# Patient Record
Sex: MALE | Race: WHITE | NOT HISPANIC OR LATINO
[De-identification: names, ages, dates, MRNs, and addresses within clinical notes are randomized per-mention and may not be internally consistent; named-entity substitution may affect disease eponyms.]

---

## 2018-01-01 NOTE — PDOC
History of Present Illness





- General


Chief Complaint: Pain, Acute


Stated Complaint: STOMACH PAIN


Time Seen by Provider: 12/31/17 23:53


History Source: Patient


Exam Limitations: No Limitations





- History of Present Illness


Travel History: No


Initial Comments: 





01/01/18 00:18


57y M hx of ETOH abuse prsents with complaint of abdominal pain. Pt states the 

pain started pretty suddently around 7pm, associated with nbnb vomiting, is 

worse int he upper abd/right side. pt last ate dinner around 3-4 hrs prior to 

onset of the pain. Pt denies any cp, sob, fever/chills, diarrhea, melena, 

numbness/tingling/ewakness.  no prior history of similar pain before. the pain 

does not radiate. he has not taken any medications for the pain.  Pt denies any 

urinary complaint





psx: appendectomy


+frequent ETOH use, +binges on and off, drank alot this past week, last use was 

yesterday





Past History





- Past Medical History


Allergies/Adverse Reactions: 


 Allergies











Allergy/AdvReac Type Severity Reaction Status Date / Time


 


pollen extracts Allergy   Verified 01/01/18 00:33


 


seasonal allergies Allergy Mild  Uncoded 12/31/17 23:55











Home Medications: 


Ambulatory Orders





No Home Medications 0 dose .ROUTE UTDICT 09/30/12 











- Suicide/Smoking/Psychosocial Hx


Smoking Status: No


Smoking History: Never smoked


Have you smoked in the past 12 months: No


Number of Cigarettes Smoked Daily: 0


Information on smoking cessation initiated: No


Hx Alcohol Use: No


Drug/Substance Use Hx: No


Substance Use Type: None





**Review of Systems





- Review of Systems


Able to Perform ROS?: Yes


Comments:: 





01/01/18 00:23


Constitutional - no reported Fever, Chills, 


HEENT: no reported vision changes, sore throat


Respiratory: no reported cough, sob, hemoptysis


Cardiac: no reported chest pain, palpitations, light headedness, leg swelling


Abd/GI: + abd pain, nausea, vomiting, no reported blood per rectum, melena, 

diarrhea


: no reported dysuria, frequency, discharge


Musculskelatal - no reported back pain, joint swelling


skin - no reported bruising, erythema, rash


neurological: no reported headache, numbness, focal weakness, tingling, ataxia, 


hematologic: no reported anemia, easy bruising, easy bleeding








*Physical Exam





- Vital Signs


 Last Vital Signs











Temp Pulse Resp BP Pulse Ox


 


 97.9 F   65   22   165/77   100 


 


 12/31/17 23:55  12/31/17 23:55  12/31/17 23:55  12/31/17 23:55  12/31/17 23:55














- Physical Exam


Comments: 





01/01/18 00:24


GENERAL: The patient is awake, alert, and fully oriented, maoning in pain 


HEAD: Normocephalic, atraumatic.


EYES: extraocular movements intact, sclera anicteric, conjunctiva clear.


ENT: Normal voice,  Moist mucous membranes.


NECK: Normal range of motion, supple


LUNGS: Breath sounds equal, clear to auscultation bilaterally.  No wheezes, no 

rhonchi, no rales.


HEART: Regular rate and rhythm, normal S1 and S2 without murmur, rub or gallop.


ABDOMEN: +RUQ, epigastric tenderness on exam Soft, normoactive bowel sounds.  

No guarding, no rebound.  . No CVA tenderness


EXTREMITIES: Normal range of motion, no edema.  No clubbing or cyanosis. No 

cords, erythema, or tenderness.


NEUROLOGICAL: No facial assymetry, Normal speech, 


PSYCH: Normal mood, normal affect.


SKIN: Warm, Dry, normal turgor,





**Heart Score/ECG Review





- ECG Impressions


Comment:: 





01/01/18 00:54


Twelve-lead EKG was performed and reviewed by me. 


There is normal sinus rhythm with a normal rate. 


Rate of 83


The axis is normal. 


Artifacts present


No ST changes suggestive of acute ischemia





ED Treatment Course





- LABORATORY


CBC & Chemistry Diagram: 


 01/01/18 00:16





 01/01/18 00:16





Medical Decision Making





- Medical Decision Making





01/01/18 00:26


57y M presents with sudden onset of epgiastric/abdominal pain associated with 

vomiting that is nbnb


on exam pt is moaning in pain with RUQ tenderness





suspect pancreatitis vs gall stones


will ck us


will ck labs


will give morhine, fluids, zofran


will reassess








01/01/18 01:14


pt in persistent pain 


pt written for dilaudid 1mg


awaiting US





01/01/18 02:55


labs reviewed - essentially unremarkble beside mild hypokalemia


lipase and lfts wnl


pt feeling improved


currently asymptomatic


abdominal exam soft nontender


US shows some sludging without signs of acute cholecystitis





pt has a GI doc in [place] that he will follow up








I discussed the physical exam findings, ancillary test results and final 

diagnoses with the patient. I answered all of the patient's questions. The 

patient was satisfied with the care received and felt comfortable with the 

discharge plan and treatment plan. The patient will call their primary care 

physician within 24 hours to arrange follow-up and will return to the Emergency 

Department with any new, persistent or worsening symptoms. 








*DC/Admit/Observation/Transfer


Diagnosis at time of Disposition: 


Abdominal pain


Qualifiers:


 Abdominal location: right lower quadrant Qualified Code(s): R10.31 - Right 

lower quadrant pain








- Discharge Dispostion


Disposition: HOME


Condition at time of disposition: Improved


Admit: No





- Referrals


Referrals: 


Dyllan Gordon [Non Staff, Medical] - 





- Patient Instructions


Printed Discharge Instructions:  DI for Abdominal Pain-Adult


Additional Instructions: 


Return to the emergency department immediately with ANY new, persistent or 

worsening symptoms including worsening abdominal pain, fevers, inability to 

tolerate oral intake, chest pain, shortness of breath or any other concerns. 





Try to avoid any fatty foods. 


Stay well hydrated. 


A copy of your lab work and ultrasound results were included.


You MUST call and follow up with your doctor tomorrow. Make appointment with 

your GI doctor in [place]. Your emergency department visit is not complete 

without a followup with your doctor for reevaluation. Please make sure your 

doctor reviews the results of your emergency evaluation.





Print Language: ENGLISH





- Post Discharge Activity

## 2018-01-01 NOTE — EKG
Test Reason : 

Blood Pressure : ***/*** mmHG

Vent. Rate : 065 BPM     Atrial Rate : 065 BPM

   P-R Int : 172 ms          QRS Dur : 066 ms

    QT Int : 432 ms       P-R-T Axes : 053 048 023 degrees

   QTc Int : 449 ms

 

SINUS RHYTHM WITH MARKED SINUS ARRHYTHMIA

NONSPECIFIC ST ABNORMALITY

ABNORMAL ECG

WHEN COMPARED WITH ECG OF 30-SEP-2012 21:48,

NO SIGNIFICANT CHANGE WAS FOUND

BASELINE ARTIFACT AND BASELINE WANDER

Confirmed by JOSE SZYMANSKI, DRABY ([id number]) on 1/1/2018 2:13:46 PM

 

Referred By:             Confirmed By:DARBY GARCIA MD

## 2018-01-02 NOTE — HP
CHIEF COMPLAINT: Abdominal pain, nausea, vomiting





PCP:





HISTORY OF PRESENT ILLNESS:


57 year-old male with a PMH significant for perforated appendicitis s/p lap 

appendectomy in August 2017, and alcohol abuse s/p 30-day detox in September 2017 in [place]. Patient drinks allyson most days, 12 shots per day. Lately has 

been trying to wean himself off alcohol, so yesterday only had 6 shots. On New 

Year's Zamzam, patient developed acute upper right abdominal pain with nausea and 

vomiting. He came to the ED where he was treated and released. He re-presented 

earlier today with the same symptoms. Patient denies fever, sweats, chills. He 

complains of constipation. 





ER course was notable for:


(1) Lactic acid 5.0-->0.9


(2) Total bili 0.4-->1.2 


(3) AST 12-->181


(4) CTAP: distended gallbladder with wall thickening and pericholecystic fat 

stranding, consistent with acute cholecystitis; no biliary duct dilitation 





Recent Travel:


[place]





PAST MEDICAL HISTORY:


Alcohol abuse





PAST SURGICAL HISTORY:


Appendectomy 08/2017





Social History:


Smoking: qit 11 years ago


Alcohol: allyson 12 shots per day


Drugs: denies





Family History:


Allergies


pollen extracts Allergy (Verified 01/02/18 08:16)





 ADDED FOR "SEASONAL ALLERGIES" 


seasonal allergies Allergy (Mild, Uncoded 01/02/18 08:16)


 








HOME MEDICATIONS:


 Home Medications











 Medication  Instructions  Recorded


 


Oxycodone HCl/Acetaminophen 1 tab PO Q6H PRN 01/02/18





[Percocet 5-325 mg Tablet]  








REVIEW OF SYSTEMS


CONSTITUTIONAL: 


Absent:  fever, chills, diaphoresis, generalized weakness, malaise, loss of 

appetite, weight change


HEENT: 


Absent:  rhinorrhea, nasal congestion, throat pain, throat swelling, difficulty 

swallowing, mouth swelling, ear pain, eye pain, visual changes


CARDIOVASCULAR: 


Absent: chest pain, syncope, palpitations, irregular heart rate, lightheadedness

, peripheral edema


RESPIRATORY: 


Absent: cough, shortness of breath, dyspnea with exertion, orthopnea, wheezing, 

stridor, hemoptysis


GASTROINTESTINAL:


+abdominal pain, distension, nausea, vomiting, constipation


Absent: diarrhea, melena, hematochezia


GENITOURINARY: 


Absent: dysuria, frequency, urgency, hesitancy, hematuria, flank pain, genital 

pain


MUSCULOSKELETAL: 


Absent: myalgia, arthralgia, joint swelling, back pain, neck pain


SKIN: 


Absent: rash, itching, pallor


HEMATOLOGIC/IMMUNOLOGIC: 


Absent: easy bleeding, easy bruising, lymphadenopathy, frequent infections


ENDOCRINE:


Absent: unexplained weight gain, unexplained weight loss, heat intolerance, 

cold intolerance


NEUROLOGIC: 


Absent: headache, focal weakness or paresthesias, dizziness, unsteady gait, 

seizure, mental status changes, bladder or bowel incontinence


PSYCHIATRIC: 


Absent: anxiety, depression, suicidal or homicidal ideation, hallucinations.








PHYSICAL EXAMINATION


 Vital Signs - 24 hr











  01/02/18





  08:19


 


Temperature 98.7 F


 


Pulse Rate 77


 


Respiratory 18





Rate 


 


Blood Pressure 139/91


 


O2 Sat by Pulse 100





Oximetry (%) 











GENERAL: Awake, alert, and fully oriented, in no acute distress.


HEAD: Normal with no signs of trauma.


EYES: Pupils equal, round and reactive to light, extraocular movements intact, 

sclera anicteric, conjunctiva clear. No lid lag.


EARS, NOSE, THROAT: Ears normal, nares patent, oropharynx clear without 

exudates. Moist mucous membranes.


NECK: Normal range of motion, supple without lymphadenopathy, JVD, or masses.


LUNGS: Breath sounds equal, clear to auscultation bilaterally. No wheezes, and 

no crackles. No accessory muscle use.


HEART: Regular rate and rhythm, normal S1 and S2 without murmur, rub or gallop.


ABDOMEN: Soft, nontender, not distended, normoactive bowel sounds, no guarding, 

no rebound, no masses.  


MUSCULOSKELETAL: Normal range of motion at all joints. No bony deformities or 

tenderness. No CVA tenderness.


UPPER EXTREMITIES: 2+ pulses, warm, well-perfused. No cyanosis. No clubbing. No 

peripheral edema.


LOWER EXTREMITIES: 2+ pulses, warm, well-perfused. No calf tenderness. No 

peripheral edema. 


NEUROLOGICAL:  Cranial nerves II-XII intact. Normal speech. 


PSYCHIATRIC: Cooperative. Good eye contact. Appropriate mood and affect.


SKIN: Warm, dry, normal turgor








 Laboratory Results - last 24 hr











  01/02/18 01/02/18 01/02/18





  09:20 10:25 10:25


 


WBC   7.3 


 


RBC   5.17 


 


Hgb   15.1 


 


Hct   45.4 


 


MCV   87.8 


 


MCH   29.1 


 


MCHC   33.2 


 


RDW   15.7 


 


Plt Count   178 


 


MPV   8.5 


 


Neutrophils %   70.0 


 


Lymphocytes %   22.8 


 


Monocytes %   6.1 


 


Eosinophils %   0.2 


 


Basophils %   0.9 


 


PT with INR   


 


INR   


 


PTT (Actin FS)   


 


Sodium    142


 


Potassium    4.1  D


 


Chloride    102


 


Carbon Dioxide    28


 


Anion Gap    12


 


BUN    9


 


Creatinine    0.8


 


Creat Clearance w eGFR    > 60


 


Random Glucose    87  D


 


Lactic Acid   


 


Calcium    8.8


 


Total Bilirubin    1.2 H D


 


AST    181 H D


 


ALT    68  D


 


Alkaline Phosphatase    185 H D


 


Creatine Kinase    59


 


Troponin I    < 0.02


 


Total Protein    6.7


 


Albumin    3.2 L


 


Lipase    82


 


Urine Color  Yellow  


 


Urine Appearance  Clear  


 


Urine pH  7.0  


 


Ur Specific Gravity  1.017  


 


Urine Protein  Negative  


 


Urine Glucose (UA)  Negative  


 


Urine Ketones  Negative  


 


Urine Blood  Negative  


 


Urine Nitrite  Negative  


 


Urine Bilirubin  Negative  


 


Urine Urobilinogen  Negative  


 


Ur Leukocyte Esterase  Negative  


 


Blood Type   


 


Antibody Screen   














  01/02/18 01/02/18 01/02/18





  10:25 10:25 10:25


 


WBC   


 


RBC   


 


Hgb   


 


Hct   


 


MCV   


 


MCH   


 


MCHC   


 


RDW   


 


Plt Count   


 


MPV   


 


Neutrophils %   


 


Lymphocytes %   


 


Monocytes %   


 


Eosinophils %   


 


Basophils %   


 


PT with INR   


 


INR   


 


PTT (Actin FS)   


 


Sodium   


 


Potassium   


 


Chloride   


 


Carbon Dioxide   


 


Anion Gap   


 


BUN   


 


Creatinine   


 


Creat Clearance w eGFR   


 


Random Glucose   


 


Lactic Acid  5.0 H*  


 


Calcium   


 


Total Bilirubin   


 


AST   


 


ALT   


 


Alkaline Phosphatase   


 


Creatine Kinase   Cancelled 


 


Troponin I   Cancelled 


 


Total Protein   


 


Albumin   


 


Lipase   


 


Urine Color   


 


Urine Appearance   


 


Urine pH   


 


Ur Specific Gravity   


 


Urine Protein   


 


Urine Glucose (UA)   


 


Urine Ketones   


 


Urine Blood   


 


Urine Nitrite   


 


Urine Bilirubin   


 


Urine Urobilinogen   


 


Ur Leukocyte Esterase   


 


Blood Type    O POSITIVE


 


Antibody Screen    Negative














  01/02/18





  10:25


 


WBC 


 


RBC 


 


Hgb 


 


Hct 


 


MCV 


 


MCH 


 


MCHC 


 


RDW 


 


Plt Count 


 


MPV 


 


Neutrophils % 


 


Lymphocytes % 


 


Monocytes % 


 


Eosinophils % 


 


Basophils % 


 


PT with INR  12.10 H


 


INR  1.07


 


PTT (Actin FS)  31.1


 


Sodium 


 


Potassium 


 


Chloride 


 


Carbon Dioxide 


 


Anion Gap 


 


BUN 


 


Creatinine 


 


Creat Clearance w eGFR 


 


Random Glucose 


 


Lactic Acid 


 


Calcium 


 


Total Bilirubin 


 


AST 


 


ALT 


 


Alkaline Phosphatase 


 


Creatine Kinase 


 


Troponin I 


 


Total Protein 


 


Albumin 


 


Lipase 


 


Urine Color 


 


Urine Appearance 


 


Urine pH 


 


Ur Specific Gravity 


 


Urine Protein 


 


Urine Glucose (UA) 


 


Urine Ketones 


 


Urine Blood 


 


Urine Nitrite 


 


Urine Bilirubin 


 


Urine Urobilinogen 


 


Ur Leukocyte Esterase 


 


Blood Type 


 


Antibody Screen 











ASSESSMENT/PLAN


57 year-old male significant for perforated appendicitis s/p lap appendectomy 

in August 2017, and alcohol abuse. Admitted for acute cholecystitis. 





Acute cholecystitis


   --spike in total bili and AST over 24 hour period 


   --MRCP ordered


   --surgery consult pending


   --start ceftriaxone 





Alcohol abuse


   --start librium taper 


   --ativan PRN for seizure activity





FEN


   Fluids: NS @ 125mL/hr


   Electrolytes: replete as indicated


   Nutrition: NPO





DVT prophylaxis: subq heparin





Dispo: continues to require inpatient care. Full code.











Visit type





- Emergency Visit


Emergency Visit: Yes


ED Registration Date: 01/02/18


Care time: The patient presented to the Emergency Department on the above date 

and was hospitalized for further evaluation of their emergent condition.





- New Patient


This patient is new to me today: Yes


Date on this admission: 01/02/18





- Critical Care


Critical Care patient: No

## 2018-01-02 NOTE — PDOC
History of Present Illness





- General


History Source: Patient


Exam Limitations: No Limitations





- History of Present Illness


Initial Comments: 





01/02/18 09:50





The patient is a 57 year old male with a significant PMH of perforated 

appendicitis s/p laparoscopic appendectomy in August 2017 and GERD who presents 

to the emergency department with persistent RUQ pain and episodes of emesis for 

over 2 days. The patient was at our facility on 1/1/18 for RUQ pain and US 

showed a possible early cholecystitis that will require further imaging. The 

patient states the RUQ pain has slightly improved after pain meds but is now 

radiating to the suprapubic area. The patient reports the RUQ pain is a 7/10 

with associated discomfort and 2-3 episodes of bilious, non-bloody emesis 

approximately 4 hours after fatty meals. The patient states he is unable to 

tolerate PO intake. The patient notes he had water this morning and vomited 

immediately after. The patient states his last alcoholic beverage was yesterday 

at lunch time. The patient also reports associated constipation and his last 

bowel movement 2-3 days ago. The patient took MiraLAX today with no improvement 

of symptoms. 


 


The patient denies chest pain, shortness of breath, headache and dizziness.


Denies fever, chills, and diarrhea.


Denies dysuria, frequency, urgency and hematuria.


 


Allergies: pollen extracts 


Past surgical history: Appendectomy in August 2017 and hernia repair.


Social history: Alcohol use. No reported cigarette or drug use. 


PCP: Dr. Gordon 


 














<Anastasiia Sosa - Last Filed: 01/02/18 10:18>





<Wolfgang Marie - Last Filed: 01/02/18 15:41>





- General


Chief Complaint: Pain


Stated Complaint: REVISIT, ABD PAIN


Time Seen by Provider: 01/02/18 08:32





Past History





<Anastasiia Sosa - Last Filed: 01/02/18 10:18>





- Past Medical History


COPD: No


GI Disorders: Yes (GERD,IBS)





- Surgical History


Abdominal Surgery: Yes (ING.HERNIA)


Appendectomy: Yes





- Suicide/Smoking/Psychosocial Hx


Smoking Status: No


Smoking History: Never smoked


Have you smoked in the past 12 months: No


Number of Cigarettes Smoked Daily: 0


Information on smoking cessation initiated: No


Hx Alcohol Use: Yes


Drug/Substance Use Hx: No


Substance Use Type: None





<Wolfgang Marie - Last Filed: 01/02/18 15:41>





- Past Medical History


Allergies/Adverse Reactions: 


 Allergies











Allergy/AdvReac Type Severity Reaction Status Date / Time


 


pollen extracts Allergy   Verified 01/02/18 08:16


 


seasonal allergies Allergy Mild  Uncoded 01/02/18 08:16











Home Medications: 


Ambulatory Orders





Oxycodone HCl/Acetaminophen [Percocet 5-325 mg Tablet] 1 tab PO Q6H PRN 01/02/ 18 











**Review of Systems





- Review of Systems


Able to Perform ROS?: Yes





<Anastasiia Sosa - Last Filed: 01/02/18 10:18>





- Review of Systems


Constitutional: No: Chills, Fever, Night Sweats


Respiratory: No: Cough, Shortness of Breath


Cardiac (ROS): No: Chest Pain


ABD/GI: Yes: Constipated, Nausea, Vomiting.  No: Diarrhea


: No: Burning, Dysuria, Hematuria


Musculoskeletal: No: Muscle Pain


Neurological: No: Headache


All Other Systems: Reviewed and Negative





<Wolfgang Marie - Last Filed: 01/02/18 15:41>





*Physical Exam





- Vital Signs


 Last Vital Signs











Temp Pulse Resp BP Pulse Ox


 


 98.7 F   77   18   139/91   100 


 


 01/02/18 08:19  01/02/18 08:19  01/02/18 08:19  01/02/18 08:19  01/02/18 08:19














- Physical Exam


Comments: 





01/02/18 10:04


GENERAL: The patient is awake, alert, and fully oriented, in no acute distress.


HEAD: Normal with no signs of trauma.


EYES: Pupils equal, round and reactive to light, extraocular movements intact, 

sclera anicteric, conjunctiva clear with no pallor.


ENT: Ears normal, nares patent, oropharynx clear without exudates.  Moist 

mucous membranes.


NECK: Normal range of motion, supple without lymphadenopathy, JVD, or masses.


LUNGS: Breath sounds equal, clear to auscultation bilaterally.  No wheeze/

crackles.


HEART: Regular rate and rhythm, normal S1 and S2 without murmur or rub.


ABDOMEN: (+) Guarding isolated to the RUQ, less in the right mid abdomen. (+) 

Slightly distended. (+) Bowel sounds slightly decreased. Soft.  No rebound.  No 

palpable masses. No hepatosplenomegaly.


EXTREMITIES: Normal range of motion, no edema.  No clubbing or cyanosis. No 

cords, erythema, or tenderness.


NEUROLOGICAL: Cranial nerves II through XII grossly intact.  Normal speech, 

normal gait.


PSYCH: Normal mood, normal affect.


SKIN: Warm, Dry, normal turgor, no rashes or lesions noted.

















<Anastasiia Sosa - Last Filed: 01/02/18 10:18>





- Vital Signs


 Last Vital Signs











Temp Pulse Resp BP Pulse Ox


 


 98.7 F   77   18   139/91   100 


 


 01/02/18 08:19  01/02/18 08:19  01/02/18 08:19  01/02/18 08:19  01/02/18 08:19














<Wolfgang Marie - Last Filed: 01/02/18 15:41>





ED Treatment Course





- LABORATORY


CBC & Chemistry Diagram: 


 01/02/18 10:25





 01/02/18 10:25





<Wolfgang Marie - Last Filed: 01/02/18 15:41>





Medical Decision Making





- Medical Decision Making





01/02/18 10:11


A portion of this note was documented by scribe services under my direction. I 

have reviewed the details of the note, within reason, and agree with the 

documentation with the following case summary and management plan written by me.





57-year-old male with history of perforated appendicitis August 2017 status 

post laparoscopic appendectomy at outside hospital, presents here for second 

visit over 2 days for right upper quadrant pain. Patient seen here yesterday, 

had labs that were within normal limits and ultrasound was equivocal for 

possible early cholecystitis. The patient's symptoms improved after pain control

, he was discharged home.


Last night, patient noted onset again of his right upper quadrant pain about 4 

hours after a heavy meal, now persistent and severe and associated with bilious 

vomiting that continues today. The pain has slightly improved since yesterday, 

has radiated slightly lower in the right abdomen, and is associated with some 

constipation. Last bowel movement was 2 or 3 days ago, took MiraLAX today but 

is only passing small amounts of gas.





Afebrile, vital signs stable.


Well-appearing


Dry mucosa


Abdomen is soft and slightly distended. Tender with guarding in the right upper 

quadrant, discomfort to palpation in the right mid and right lower quadrant. No 

rebound. Bowel sounds are slightly decreased. 





57-year-old male with questionable cholecystitis seen on ultrasound yesterday, 

now with recurring right upper quadrant pain and bilious vomiting concerning 

for persistent or worsening cholecystitis. Question associated ileus, given the 

recent perforated appendicitis would rule out superimposed obstruction.


Labs, urinalysis


IV fluids, pain control, nausea control


CT of the abdomen and pelvis


Likely admission





01/02/18 11:21


No leukocytosis, white count 7.


AST and alkaline phosphatase notably elevated since yesterday.


Total bili 1.2, ALT normal.


Lipase normal. 





Presentation still consistent with biliary process, CTAP pending. 


01/02/18 15:11


CTAP with distended GB consistent with cholecystitis. Abx ordered, surgery 

consulted, will proceed with admission. 





01/02/18 15:40


Accepted for inpatient med/surg by Dr. Vo, signout given to NANI Clark. 





<Wolfgang Marie - Last Filed: 01/02/18 15:41>





*DC/Admit/Observation/Transfer





- Attestations


Scribe Attestion: 





01/02/18 10:06





Documentation prepared by Anastasiia Sosa, acting as medical scribe for Wolfgang Marie MD.





<Anastasiia Sosa - Last Filed: 01/02/18 10:18>





- Discharge Dispostion


Admit: Yes





<Wolfgang Marie - Last Filed: 01/02/18 15:41>


Diagnosis at time of Disposition: 


 Cholecystitis





Abdominal pain


Qualifiers:


 Abdominal location: right upper quadrant Qualified Code(s): R10.11 - Right 

upper quadrant pain








- Discharge Dispostion


Condition at time of disposition: Fair





- Referrals


Referrals: 


Dyllan Gordon [Primary Care Provider] - 





- Patient Instructions





- Post Discharge Activity

## 2018-01-03 NOTE — CONSULT
- Consultation


REQUESTING PROVIDER: Dr. Vegas





CONSULT REQUEST: We have been asked to surgically evaluate this patient for 

elevated bilirubin/Gall bladder disease.





PCP:Rissa Clark





HISTORY OF PRESENT ILLNESS: The patient is 58 yo male who presented for 

recurrent epigastric pain. He had nausea and emesis but his symptoms have 

improved. He was seen and treated for RUQ pain the day prior. Yesterday he had 

alcohol earlier in the day and chinese food for dinner. The patient denies any 

fever or chills.





PMHx:    GERD, sleep apnea uses a mouth guard, left ankle fracture 





PSHx:  appendectomy, left jaw surgery from trauma      


 Home Medications











 Medication  Instructions  Recorded


 


Oxycodone HCl/Acetaminophen 1 tab PO Q6H PRN 01/02/18





[Percocet 5-325 mg Tablet]  








 Allergies











Allergy/AdvReac Type Severity Reaction Status Date / Time


 


pollen extracts Allergy   Verified 01/02/18 08:16


 


seasonal allergies Allergy Mild  Uncoded 01/02/18 08:16








REVIEW OF SYSTEMS:


CONSTITUTIONAL: 


Absent:  fever, chills, Present: weight change(intentional with decreased food 

intake and then jaw wiring from trauma/fracture)


CARDIOVASCULAR: 


Absent: chest pain,  palpitations.


RESPIRATORY: 


Absent: cough, shortness of breath


GASTROINTESTINAL:


Present: abdominal pain, nausea, vomiting


GENITOURINARY: 


Absent: dysuria, frequency


MUSCULOSKELETAL: 


Absent: myalgia, arthralgia


SKIN: 


Absent: rash, itching, pallor


HEMATOLOGIC/IMMUNOLOGIC: 


Absent: easy bleeding, easy bruising


NEUROLOGIC: 


Absent: headache,  paresthesias








PHYSICAL EXAM:


GENERAL: Awake, alert, and fully oriented, in no acute distress.


HEAD: Normal with no signs of trauma.


EYES: sclera anicteric, conjunctiva clear.


NECK: Normal ROM, supple without lymphadenopathy


LUNGS: Clear to auscultation bilat anteriorly. No wheezes, and no crackles.


HEART: Regular rate and rhythm. No murmurs


ABDOMEN: Soft, nontender, not distended,  no guarding, mild RUQ tenderness. RIH 

with reducible inguinal hernia. No LIH masses.


MUSCULOSKELETAL: Normal ROM at all joints. No bony deformities or tenderness.


UPPER EXTREMITIES: 2+ pulses, warm, well-perfused. No cyanosis. Cap refill <2 

seconds. No peripheral edema.


LOWER EXTREMITIES: 2+ pulses, warm, well-perfused. No calf tenderness. No 

peripheral edema. 


NEUROLOGICAL: Normal speech, gait not observed.


PSYCH: Cooperative. Good eye contact. Appropriate mood and affect.


SKIN: Warm, dry.


 Vital Signs











Temperature  97.6 F   01/03/18 07:12


 


Pulse Rate  63   01/03/18 07:12


 


Respiratory Rate  20   01/03/18 07:12


 


Blood Pressure  135/85   01/03/18 07:12


 


O2 Sat by Pulse Oximetry (%)  98   01/02/18 23:30








 Lab Results











WBC  4.2 K/mm3 (4.0-10.0)  D 01/03/18  06:00    


 


RBC  4.52 M/mm3 (4.00-5.60)   01/03/18  06:00    


 


Hgb  13.3 GM/dL (11.7-16.9)  D 01/03/18  06:00    


 


Hct  39.8 % (35.4-49)   01/03/18  06:00    


 


MCV  88.1 fl (80-96)   01/03/18  06:00    


 


MCHC  33.3 g/dl (32.0-35.9)   01/03/18  06:00    


 


RDW  14.9 % (11.9-15.9)   01/03/18  06:00    


 


Plt Count  141 K/MM3 (134-434)  D 01/03/18  06:00    


 


Sodium  140 mmol/L (136-145)   01/03/18  06:00    


 


Potassium  3.6 mmol/L (3.5-5.1)   01/03/18  06:00    


 


Chloride  103 mmol/L ()   01/03/18  06:00    


 


Carbon Dioxide  31 mmol/L (21-32)   01/03/18  06:00    


 


Anion Gap  6  (8-16)  L  01/03/18  06:00    


 


BUN  6 mg/dL (7-18)  L D 01/03/18  06:00    


 


Creatinine  0.8 mg/dL (0.7-1.3)   01/03/18  06:00    


 


Random Glucose  90 mg/dL ()   01/03/18  06:00    


 


Calcium  7.8 mg/dL (8.5-10.1)  L  01/03/18  06:00    


 


Blood Type  O POSITIVE   01/02/18  10:25    


 


Antibody Screen  Negative   01/02/18  10:25    


 


INR  1.07  (0.82-1.09)   01/02/18  10:25    








 Laboratory Tests











  01/01/18 01/02/18 01/03/18





  00:16 10:25 06:00


 


Total Bilirubin  0.4  D  1.2 H D  1.4 H


 


Direct Bilirubin  < 0.2  


 


AST  12 L D  181 H D  206 H


 


ALT  14  D  68  D  151 H D


 


Alkaline Phosphatase  67  D  185 H D  236 H D








US: 1/2: distended/thickened GB without stones    1/1: 1/1: sludge seen within 

GB no stones





CT scan: 1/2: distended GB with pericholecystic fluid





Problem List





- Problems


(1) Cholecystitis


Assessment/Plan: 


Pt with with Dr. Vegas today, evidence of distended GB with sludge and 

elevated LFTs


MRI/MRCP ordered to r/o CBD pathology


Recommend to continue npo/iv hydration


IV abx rocephin


Trend LFTs


Pt on librium/detox protocol


Code(s): K81.9 - CHOLECYSTITIS, UNSPECIFIED   





Visit type





- Case Type


Case Type: ED Admission





- Emergency


Emergency Visit: Yes


ED Registration Date: 01/02/18


Care time: The patient presented to the Emergency Department on the above date 

and was hospitalized for further evaluation of their emergent condition.





- New patient


This patient is new to me today: Yes


Date on this admission: 01/03/18

## 2018-01-03 NOTE — EKG
Test Reason : 

Blood Pressure : ***/*** mmHG

Vent. Rate : 060 BPM     Atrial Rate : 060 BPM

   P-R Int : 172 ms          QRS Dur : 076 ms

    QT Int : 406 ms       P-R-T Axes : 050 026 029 degrees

   QTc Int : 406 ms

 

NORMAL SINUS RHYTHM

NORMAL ECG

WHEN COMPARED WITH ECG OF 01-JAN-2018 00:51,

NO SIGNIFICANT CHANGE WAS FOUND

Confirmed by MD Haddad Edward ([id number]) on 1/3/2018 9:18:04 AM

 

Referred By:             Confirmed By:Obed Haddad MD

## 2018-01-03 NOTE — PN
Physical Exam: 


SUBJECTIVE: Patient seen and examined at bedside. States he does not feel well, 

with some lower abdominal pain. No nausea, no vomiting. Denies anxiety, 

jitteriness, sweats, chills. Feels his alcoholism is being overemphasized. 

Explained the need to medically stabilize him prior to surgery. 





OBJECTIVE:





 Vital Signs











 Period  Temp  Pulse  Resp  BP Sys/Norris  Pulse Ox


 


 Last 24 Hr  97.6 F-99.4 F  61-70  16-20  126-135/78-96  96-98











GENERAL: The patient is awake, alert, and fully oriented, in no acute distress.


LUNGS: Breath sounds equal, clear to auscultation bilaterally, no wheezes, no 

crackles, no accessory muscle use. 


HEART: Regular rate and rhythm, S1, S2 without murmur, rub or gallop.


ABDOMEN: Soft, nontender, nondistended, normoactive bowel sounds, no guarding, 

no rebound


EXTREMITIES: 2+ pulses, warm, well-perfused, no edema. 


NEUROLOGICAL: Cranial nerves II through XII grossly intact. Normal speech, gait 

not observed. No tremors, no asterixis. 


SKIN: Warm and dry.








 Laboratory Results - last 24 hr











  01/02/18 01/02/18 01/03/18





  09:20 17:30 06:00


 


WBC    4.2  D


 


RBC    4.52


 


Hgb    13.3  D


 


Hct    39.8


 


MCV    88.1


 


MCH    29.4


 


MCHC    33.3


 


RDW    14.9


 


Plt Count    141  D


 


MPV    8.7


 


Sodium   


 


Potassium   


 


Chloride   


 


Carbon Dioxide   


 


Anion Gap   


 


BUN   


 


Creatinine   


 


Creat Clearance w eGFR   


 


Random Glucose   


 


Lactic Acid   0.9 


 


Calcium   


 


Phosphorus   


 


Magnesium   


 


Total Bilirubin   


 


AST   


 


ALT   


 


Alkaline Phosphatase   


 


Total Protein   


 


Albumin   


 


Lipase   


 


Ur Leukocyte Esterase  Negative  














  01/03/18





  06:00


 


WBC 


 


RBC 


 


Hgb 


 


Hct 


 


MCV 


 


MCH 


 


MCHC 


 


RDW 


 


Plt Count 


 


MPV 


 


Sodium  140


 


Potassium  3.6


 


Chloride  103


 


Carbon Dioxide  31


 


Anion Gap  6 L


 


BUN  6 L D


 


Creatinine  0.8


 


Creat Clearance w eGFR  > 60


 


Random Glucose  90


 


Lactic Acid 


 


Calcium  7.8 L


 


Phosphorus  3.8


 


Magnesium  2.0


 


Total Bilirubin  1.4 H


 


AST  206 H


 


ALT  151 H D


 


Alkaline Phosphatase  236 H D


 


Total Protein  5.9 L


 


Albumin  2.7 L


 


Lipase  69 L


 


Ur Leukocyte Esterase 








                  Active Medications











Generic Name Dose Route Start Last Admin





  Trade Name Freq  PRN Reason Stop Dose Admin


 


Chlordiazepoxide HCl  50 mg  01/02/18 23:00  01/03/18 12:00





  Librium -  PO  01/03/18 17:01  50 mg





  [id number] JULES   Administration


 


Chlordiazepoxide HCl  25 mg  01/03/18 23:00  





  Librium -  PO  01/04/18 17:01  





  [id number] JULES   


 


Chlordiazepoxide HCl  15 mg  01/04/18 23:00  





  Librium -  PO  01/05/18 17:01  





  [id number] JULES   


 


Chlordiazepoxide HCl  25 mg  01/02/18 23:42  





  Librium -  PO  01/05/18 23:41  





  Q4H PRN   





  WITHDRAWAL(CONT SUBST)   


 


Docusate Sodium  100 mg  01/03/18 14:00  





  Colace -  PO   





  TID JULES   


 


Hydromorphone HCl  1 mg  01/03/18 00:36  01/03/18 12:02





  Dilaudid Injection -  IVPUSH   1 mg





  Q6H PRN   Administration





  PAIN   


 


Dextrose/Sodium Chloride  1,000 mls @ 100 mls/hr  01/02/18 17:00  01/03/18 09:50





  D5-1/2ns -  IV   100 mls/hr





  ASDIR JULES   Administration


 


CEFTRIAXONE 1 G/50 ML PREMIX  50 mls @ 100 mls/hr  01/03/18 10:00  01/03/18 09:

50





  Ceftriaxone 1 Gm-D5w Bag  IVPB   100 mls/hr





  DAILY JULES   Administration


 


Metronidazole  500 mg in 100 mls @ 100 mls/hr  01/03/18 12:00  





  Flagyl 500mg Premixed Ivpb -  IVPB   





  Q8H-IV JULES   


 


Senna/Docusate Sodium  2 tablet  01/03/18 12:12  





  Pericolace -  PO   





  HS PRN   





  CONSTIPATION   











ASSESSMENT/PLAN


57 year-old male significant for perforated appendicitis s/p lap appendectomy 

in August 2017, and alcohol abuse. Admitted for acute cholecystitis. 





Acute cholecystitis


   --spike in total bili and AST over first 24-hour period, now trended to wnl


   --MRCP ordered


   --surgery following


   --continue ceftriaxone, start metronidazole





Alcohol abuse


   --start librium taper 


   --ativan PRN for seizure activity





FEN


   Fluids: PO intake adequate


   Electrolytes: replete as indicated


   Nutrition: clears





DVT prophylaxis: subq heparin





Dispo: continues to require inpatient care. Full code.








 





Visit type





- Emergency Visit


Emergency Visit: Yes


ED Registration Date: 01/02/18


Care time: The patient presented to the Emergency Department on the above date 

and was hospitalized for further evaluation of their emergent condition.





- New Patient


This patient is new to me today: No





- Critical Care


Critical Care patient: No

## 2018-01-04 NOTE — PN
Physical Exam: 


SUBJECTIVE: Patient seen and examined at the bedside. 





OBJECTIVE:


 Vital Signs











 Period  Temp  Pulse  Resp  BP Sys/Norris  Pulse Ox


 


 Last 24 Hr  97.3 F-98.2 F  54-64  18-20  118-132/77-88  97








GENERAL: The patient is awake, alert, and fully oriented, in no acute distress.


HEAD: Normal with no signs of trauma.


EYES: PERRL, extraocular movements intact, sclera anicteric, conjunctiva clear. 

No ptosis. 


ENT: Ears normal, nares patent, oropharynx clear without exudates, moist mucous 

membranes.


NECK: Trachea midline, full range of motion, supple. 


LUNGS: Breath sounds equal, clear to auscultation bilaterally


HEART: Regular rate and rhythm, S1, S2 without murmur, rub or gallop.


ABDOMEN: Soft, nontender, nondistended, RUQ pain on light palpation


EXTREMITIES: 2+ pulses, warm, well-perfused, no edema. 


NEUROLOGICAL: Normal speech, gait not observed.


PSYCH: Normal mood, normal affect.


SKIN: Warm, dry, normal turgor, no rashes or lesions noted





 Laboratory Results - last 24 hr











  01/04/18 01/04/18





  07:55 07:55


 


WBC  4.6 


 


RBC  4.45 


 


Hgb  13.0 


 


Hct  39.1 


 


MCV  87.9 


 


MCH  29.2 


 


MCHC  33.2 


 


RDW  15.3 


 


Plt Count  147 


 


MPV  8.6 


 


Neutrophils %  60.1 


 


Lymphocytes %  32.0  D 


 


Monocytes %  4.4 


 


Eosinophils %  2.9  D 


 


Basophils %  0.6 


 


Sodium   141


 


Potassium   3.7


 


Chloride   104


 


Carbon Dioxide   31


 


Anion Gap   6 L


 


BUN   5 L


 


Creatinine   0.8


 


Creat Clearance w eGFR   > 60


 


Random Glucose   95


 


Calcium   8.0 L


 


Total Bilirubin   0.6  D


 


AST   51 H D


 


ALT   90 H D


 


Alkaline Phosphatase   181 H D


 


Total Protein   5.7 L


 


Albumin   2.7 L








Active Medications











Generic Name Dose Route Start Last Admin





  Trade Name Freq  PRN Reason Stop Dose Admin


 


Chlordiazepoxide HCl  25 mg  01/03/18 23:00  01/04/18 05:00





  Librium -  PO  01/04/18 17:01  25 mg





  [id number] JULES   Administration


 


Chlordiazepoxide HCl  15 mg  01/04/18 23:00  





  Librium -  PO  01/05/18 17:01  





  [id number]-[id number] JULES   


 


Chlordiazepoxide HCl  25 mg  01/02/18 23:42  





  Librium -  PO  01/05/18 23:41  





  Q4H PRN   





  WITHDRAWAL(CONT SUBST)   


 


Docusate Sodium  100 mg  01/03/18 14:00  01/04/18 05:00





  Colace -  PO   100 mg





  TID JULES   Administration


 


Heparin Sodium (Porcine)  5,000 unit  01/03/18 14:00  01/04/18 05:00





  Heparin -  SQ   5,000 unit





  TID JULES   Administration


 


Hydromorphone HCl  1 mg  01/03/18 00:36  01/04/18 05:27





  Dilaudid Injection -  IVPUSH   1 mg





  Q6H PRN   Administration





  PAIN   


 


Dextrose/Sodium Chloride  1,000 mls @ 100 mls/hr  01/02/18 17:00  01/04/18 05:00





  D5-1/2ns -  IV   100 mls/hr





  ASDIR JULES   Administration


 


CEFTRIAXONE 1 G/50 ML PREMIX  50 mls @ 100 mls/hr  01/03/18 10:00  01/04/18 10:

47





  Ceftriaxone 1 Gm-D5w Bag  IVPB   100 mls/hr





  DAILY JULES   Administration


 


Metronidazole  500 mg in 100 mls @ 100 mls/hr  01/03/18 12:00  01/04/18 10:48





  Flagyl 500mg Premixed Ivpb -  IVPB   100 mls/hr





  Q8H-IV JULES   Administration


 


Polyethylene Glycol  17 gm  01/03/18 13:30  01/04/18 10:47





  Miralax (For Daily Use) -  PO   17 gm





  BID JULES   Administration











ASSESSMENT/PLAN:





Patient is a 57 year old male with a significant past medical history of 

perforated appendicitis s/p lap appendectomy on Aug 2017 and ETOH abuse.  He 

presents to the ED on 1/2/2017 for acute cholecystitis. 





GI:


Acute cholecystitis


+ RUQ pain, tolerating clears but fearful of illiciting pain


vitals stable, no fevers


Abdomen soft, non distended


LFTs trending down


MRCP reviewed


Lap arun tomorrow with Dr. Vegas


On Flagyl q8, Ceftraxone 1 gram


NPO at midnight





Psyche:


Alcohol abuse, chronic


On a librium taper


No signs of acute w/drawl on exam, calm and cooperative





FEN


Fluids: d5 1/2 ns @ 100cc/hr


Electrolytes: monitor


Nutrition: clears, advance as per surgery





DVT:


DVT: SCDS


GI: Protonix 40mg iv daily





Disposition: Full code.

## 2018-01-04 NOTE — PN
Progress Note (short form)





- Note


Progress Note: 





Attending Surgeon





Seen in f/u; RUQ w/clear liquids; o/w no c/o





VSS AF








abdomen-soft; RUQ tenderness; o/w negative








LFT's down and MRCP negative for choledocholithiasis


WBC-wnl





IMP: cholecystitis





PLAN: Continue presen tx.; lap arun possible open tomorrow; r/b/t/a/'s d/w the 

patient.








Benjy Vegas MD FACS

## 2018-01-05 NOTE — SURG
Surgery First Assist Note


First Assist: Sea Christianson PA-C


Date of Service: 01/05/18


Diagnosis: 





gallstone pancreatitis


Procedure: 





Laproscopic cholecystectomy


I was present for the entirety of the operative procedure. For further detail, 

please refer to operative report.








Visit type





- Case Type


Case Type: ED Admission

## 2018-01-05 NOTE — PN
Physical Exam: 


SUBJECTIVE: 


Patient seen and examined at the bedside.


s/p lap arun


Awake and alert, having some post op.





OBJECTIVE:


s/p lap arun with Dr. Vegas


 Vital Signs











 Period  Temp  Pulse  Resp  BP Sys/Norris  Pulse Ox


 


 Last 24 Hr  97.6 F-98.7 F  53-73  13-22  114-134/73-97  








GENERAL: The patient is awake, alert, and fully oriented, in no acute distress.


HEAD: Normal with no signs of trauma.


EYES: PERRL, extraocular movements intact, sclera anicteric, conjunctiva clear. 

No ptosis. 


ENT: Ears normal, nares patent, oropharynx clear without exudates, moist mucous 

membranes.


NECK: Trachea midline, full range of motion, supple. 


LUNGS: Breath sounds equal, clear to auscultation bilaterally


HEART: Regular rate and rhythm


ABDOMEN: surgical dressing c/d/i


EXTREMITIES:  no edema. 


NEUROLOGICAL: Normal speech, gait not observed.


 Laboratory Results - last 24 hr











  01/05/18 01/05/18





  06:00 06:00


 


WBC  4.1 


 


RBC  4.40 


 


Hgb  13.0 


 


Hct  38.9 


 


MCV  88.3 


 


MCH  29.4 


 


MCHC  33.3 


 


RDW  15.1 


 


Plt Count  157 


 


MPV  8.5 


 


Neutrophils %  60.6 


 


Lymphocytes %  30.8 


 


Monocytes %  5.7 


 


Eosinophils %  2.3 


 


Basophils %  0.6 


 


Sodium   143


 


Potassium   3.9


 


Chloride   105


 


Carbon Dioxide   29


 


Anion Gap   9


 


BUN   4 L


 


Creatinine   0.9


 


Creat Clearance w eGFR   > 60


 


Random Glucose   93


 


Calcium   8.3 L


 


Magnesium   2.2


 


Total Bilirubin   0.5


 


AST   25  D


 


ALT   64  D


 


Alkaline Phosphatase   163 H


 


Total Protein   6.0 L


 


Albumin   2.9 L








Active Medications











Generic Name Dose Route Start Last Admin





  Trade Name Freq  PRN Reason Stop Dose Admin


 


Chlordiazepoxide HCl  25 mg  01/05/18 12:38  





  Librium -  PO  01/05/18 23:41  





  Q4H PRN   





  WITHDRAWAL(CONT SUBST)   


 


Chlordiazepoxide HCl  15 mg  01/05/18 17:00  





  Librium -  PO  01/05/18 17:01  





  [id number] JULES   


 


Docusate Sodium  100 mg  01/05/18 14:00  





  Colace -  PO   





  TID JULES   


 


Fentanyl  50 mcg  01/05/18 12:38  





  Sublimaze Injection -  IVPUSH   





  [id number] PRN   





  PAIN   


 


Heparin Sodium (Porcine)  5,000 unit  01/05/18 14:00  





  Heparin -  SQ   





  TID JULES   


 


Hydromorphone HCl  1 mg  01/05/18 12:38  01/05/18 13:50





  Dilaudid Injection -  IVPB   1 mg





  Q4H PRN   Administration





  PAIN   


 


Metronidazole  500 mg in 100 mls @ 100 mls/hr  01/05/18 18:00  





  Flagyl 500mg Premixed Ivpb -  IVPB   





  Q8H-IV JULES   


 


Lactated Ringer's  1,000 mls @ 125 mls/hr  01/05/18 12:38  01/05/18 13:00





  Lactated Ringers Solution  IV   300 mls





  ASDIR JULES   Administration


 


Ondansetron HCl  4 mg  01/05/18 12:38  





  Zofran Injection  IVPUSH   





  Q6H PRN   





  NAUSEA AND/OR VOMITING   


 


Pantoprazole Sodium  40 mg  01/06/18 10:00  





  Protonix Iv  IVPUSH   





  DAILY JULES   


 


Polyethylene Glycol  17 gm  01/05/18 22:00  





  Miralax (For Daily Use) -  PO   





  BID JULES   


 


Promethazine HCl  12.5 mg  01/05/18 12:38  





  Phenergan Injection -  IVPUSH   





  Q6H PRN   





  NAUSEA-FOR RESCUE AFTER 15 MIN   











ASSESSMENT/PLAN:





Patient is a 57 year old male with a significant past medical history of 

perforated appendicitis s/p lap appendectomy on Aug 2017 and ETOH abuse.  He 

presents to the ED on 1/2/2017 with persistent RUQ pain and episodes of emesis 

for over 2 days. 





Imaging:


Abdominal MRI: distended gallbladder containing large amount of layering sludge 

with MRI findings of acute cholecystitits, no gallstones seen.  Ashly hepatitis 

edema, edema around pancreas head also seen





GI:


Acute cholecystitis s/p lap arun for gallstone pancreatitis


Tolerating diet, advance as per surgery


Vitals stable, no fevers


Abdomen soft, non distended, dressings c/d/i


LFTs trending down


On Flagyl q8





Psyche:


Alcohol abuse, chronic


Completed Libirium taper, on PRN Librium


No signs of acute w/drawl on exam, calm and cooperative





FEN


Fluids: LR @ 125cc/hr


Electrolytes: monitor


Nutrition: clears, advance as per surgery





DVT:


DVT: SCDS


GI: Protonix 40mg iv daily





Disposition: discharge likely tomorrow once tolerating diet.  full code.














Visit type





- Emergency Visit


Emergency Visit: Yes


ED Registration Date: 01/02/18


Care time: The patient presented to the Emergency Department on the above date 

and was hospitalized for further evaluation of their emergent condition.





- New Patient


This patient is new to me today: No





- Critical Care


Critical Care patient: No





- Discharge Referral


Referred to [facility] No

## 2018-01-05 NOTE — OP
Operative Note





- Note:


Operative Date: 01/05/18


Pre-Operative Diagnosis: Gallstone pancreatitis


Operation: Lap cholecystectomy


Post-Operative Diagnosis: Same as Pre-op


Surgeon: Benjy Vegas


Assistant: Sea Christianson


Anesthesiologist/CRNA: Sabiha Rocha


Anesthesia: General


Specimens Removed: gallbladder


Estimated Blood Loss (mls): 25


Fluid Volume Replaced (mls): 700


Operative Report Dictated: Yes

## 2018-01-06 NOTE — PN
Physical Exam: 


SUBJECTIVE: Patient seen and examined at bedside. Leaned over to retrieve 

something that fell to the floor and felt pain on right side. Denies symptoms 

of withdrawal. 








OBJECTIVE:





 Vital Signs











 Period  Temp  Pulse  Resp  BP Sys/Norris  Pulse Ox


 


 Last 24 Hr  97.3 F-98.7 F  53-70  13-20  111-134/75-97  











GENERAL: The patient is awake, alert, and fully oriented, in no acute distress.


LUNGS: Breath sounds equal, clear to auscultation bilaterally, no wheezes, no 

crackles, no 


accessory muscle use. 


HEART: Regular rate and rhythm, S1, S2 without murmur, rub or gallop.


ABDOMEN: Soft, nontender, nondistended, normoactive bowel sounds; surgical 

ports clean, edges well-approximated, no swelling, no edema; MIKE drain ~60cc 

sanguinous fluid


EXTREMITIES: 2+ pulses, warm, well-perfused, no edema. 


NEUROLOGICAL: Cranial nerves II through XII grossly intact. Normal speech, 

moving all extremities freely








 Laboratory Results - last 24 hr











  01/06/18 01/06/18





  07:47 07:47


 


WBC  6.2  D 


 


RBC  4.14 


 


Hgb  12.2 


 


Hct  35.9 


 


MCV  86.8 


 


MCH  29.6 


 


MCHC  34.1 


 


RDW  15.0 


 


Plt Count  165 


 


MPV  8.7 


 


Neutrophils %  76.4  D 


 


Lymphocytes %  17.4  D 


 


Monocytes %  5.9 


 


Eosinophils %  0.0  D 


 


Basophils %  0.3 


 


Sodium   142


 


Potassium   3.7


 


Chloride   107


 


Carbon Dioxide   26


 


Anion Gap   9


 


BUN   5 L D


 


Creatinine   0.7  D


 


Creat Clearance w eGFR   > 60


 


Random Glucose   90


 


Calcium   7.6 L


 


Total Bilirubin   0.5


 


AST   18  D


 


ALT   47  D


 


Alkaline Phosphatase   136 H


 


Total Protein   5.5 L


 


Albumin   2.6 L








                  


 Current Medications











Generic Name Dose Route Start Last Admin





  Trade Name Freq  PRN Reason Stop Dose Admin


 


Acetaminophen  650 mg  01/06/18 12:01  





  Tylenol -  PO   





  Q6H PRN   





  FEVER OR PAIN   


 


Docusate Sodium  100 mg  01/05/18 14:00  01/06/18 06:11





  Colace -  PO   100 mg





  TID [facility]   Administration


 


Heparin Sodium (Porcine)  5,000 unit  01/05/18 14:00  01/06/18 06:11





  Heparin -  SQ   5,000 unit





  TID JULES   Administration


 


Hydromorphone HCl  0.5 mg  01/06/18 12:00  





  Dilaudid Injection -  IVPB  01/06/18 12:01  





  ONCE ONE   


 


Metronidazole  500 mg in 100 mls @ 100 mls/hr  01/05/18 18:00  01/06/18 09:38





  Flagyl 500mg Premixed Ivpb -  IVPB   100 mls/hr





  Q8H-IV JULES   Administration


 


Oxycodone HCl  5 mg  01/06/18 12:00  





  Roxicodone -  PO   





  Q6H PRN   





  PAIN   


 


Polyethylene Glycol  17 gm  01/05/18 22:00  01/06/18 09:38





  Miralax (For Daily Use) -  PO   17 gm





  BID JULES   Administration











ASSESSMENT/PLAN


57 year-old male significant for perforated appendicitis s/p lap appendectomy 

in August 2017, and alcohol abuse. Admitted for acute cholecystitis. 





Gallstone pancreatitis s/p lap cholecystectomy 1/5/18


   --MIKE drain in place draining sanguinous fluid


   --incisions look good


   --switch to PO pain meds


   --stop IV fluids


   --encourage oob to chair, ambulation, participation in PT


   --continue metronidazole (day #4)





Alcohol abuse


   --librium taper complete


   


FEN


   Fluids: PO intake adequate


   Electrolytes: replete as indicated


   Nutrition: advance to full liquids





DVT prophylaxis: subq heparin





Dispo: continues to require inpatient care. Full code.





Visit type





- Emergency Visit


Emergency Visit: Yes


ED Registration Date: 01/02/18


Care time: The patient presented to the Emergency Department on the above date 

and was hospitalized for further evaluation of their emergent condition.





- New Patient


This patient is new to me today: No





- Critical Care


Critical Care patient: No

## 2018-01-06 NOTE — DS
Physical Exam: 


SUBJECTIVE: Patient seen and examined








OBJECTIVE:





 Vital Signs











 Period  Temp  Pulse  Resp  BP Sys/Norris  Pulse Ox


 


 Last 24 Hr  97.3 F-98.7 F  53-73  13-22  111-134/74-97  








PHYSICAL EXAM





GENERAL: The patient is awake, alert, and fully oriented, in no acute distress.


HEAD: Normal with no signs of trauma.


EYES: PERRL, extraocular movements intact, sclera anicteric, conjunctiva clear. 


ENT: Ears normal, nares patent, oropharynx clear without exudates, moist mucous 

membranes.


NECK: Trachea midline, full range of motion, supple. 


LUNGS: Breath sounds equal, clear to auscultation bilaterally, no wheezes, no 

crackles, no accessory muscle use. 


HEART: Regular rate and rhythm, S1, S2 without murmur, rub or gallop.


ABDOMEN: Soft, nontender, nondistended, normoactive bowel sounds, no guarding, 

no rebound, no hepatosplenomegaly, no masses.


EXTREMITIES: 2+ pulses, warm, well-perfused, no edema. 


NEUROLOGICAL: Cranial nerves II through XII grossly intact. Normal speech, gait 

not observed.


PSYCH: Normal mood, normal affect.


SKIN: Warm, dry, normal turgor, no rashes or lesions noted.





LABS


 Laboratory Results - last 24 hr











  01/05/18 01/06/18 01/06/18





  06:00 07:47 07:47


 


WBC   6.2  D 


 


RBC   4.14 


 


Hgb   12.2 


 


Hct   35.9 


 


MCV   86.8 


 


MCH   29.6 


 


MCHC   34.1 


 


RDW   15.0 


 


Plt Count   165 


 


MPV   8.7 


 


Neutrophils %   76.4  D 


 


Lymphocytes %   17.4  D 


 


Monocytes %   5.9 


 


Eosinophils %   0.0  D 


 


Basophils %   0.3 


 


Sodium  143   142


 


Potassium  3.9   3.7


 


Chloride  105   107


 


Carbon Dioxide  29   26


 


Anion Gap  9   9


 


BUN  4 L   5 L D


 


Creatinine  0.9   0.7  D


 


Creat Clearance w eGFR  > 60   > 60


 


Random Glucose  93   90


 


Calcium  8.3 L   7.6 L


 


Magnesium  2.2  


 


Total Bilirubin  0.5   0.5


 


AST  25  D   18  D


 


ALT  64  D   47  D


 


Alkaline Phosphatase  163 H   136 H


 


Total Protein  6.0 L   5.5 L


 


Albumin  2.9 L   2.6 L











HOSPITAL COURSE:





Date of Admission:01/02/18





Date of Discharge: 01/06/18














Discharge Summary


Reason For Visit: CHOLECYSTISIS


Current Active Problems





Abdominal pain (Acute)


Cholecystitis (Acute)








Condition: Fair





- Instructions


Diet, Activity, Other Instructions: 


Dr. Vegas Discharge Instructions





Dear TELLY MANCERA,





Post Operative Instructions





Physical activity


Resume your normal everyday activity as tolerated no heavy lifting or exercise 

until seen by your surgeon. You may walk unlimited amounts of and climb stairs. 

You may resume driving the car when you feel safe and comfortable behind the 

wheel.





Wound care


If you have a bandage, leave it on, and keep dry for 48 - 72 hours. After that 

time discard the outer bandage. If there are tapes on the skin under the outer 

bandage, leave them in place. They will peel off in the next 7 to 10 days. Do 

Not peel them off. You may shower 2 days after surgery. If there are tapes 

present on the skin, they can get wet.





Diet


There are no dietary restrictions. Eat healthy, high-fiber foods. Drink 6 to 8 

glasses of liquid each day. This will assist in keeping your bowels are regular.





Pain management


You may take Tylenol or acetaminophen or Ibuprofen (for example, Motrin, Advil 

etc.) Any pain prescription medication ordered should be taken as prescribed 

for moderate to severe pain.





Call Dr. Vegas for any of the following:





Severe pain not relieved by medication


Fever of 101 or higher


Excessive bleeding or drainage on dressing


Inability to urinate





Call the office at [phone] for a post operative appointment in 7 - 10 days.





Referrals: 


Dyllan Gordon [Primary Care Provider] - 


Disposition: HOME





- Home Medications


Comprehensive Discharge Medication List: 


Ambulatory Orders





Oxycodone HCl/Acetaminophen [Percocet 5-325 mg Tablet] 1 tab PO Q6H PRN 01/02/ 18 











- Discharge Referral


Referred to [facility] No

## 2018-01-06 NOTE — DS
Physical Exam: 


SUBJECTIVE: Patient seen and examined








OBJECTIVE:





 Vital Signs











 Period  Temp  Pulse  Resp  BP Sys/Norris  Pulse Ox


 


 Last 24 Hr  97.3 F-98.1 F  51-68  18-20  107-130/68-84  97








PHYSICAL EXAM





GENERAL: The patient is awake, alert, and fully oriented, in no acute distress.


HEAD: Normal with no signs of trauma.


EYES: PERRL, extraocular movements intact, sclera anicteric, conjunctiva clear. 


ENT: Ears normal, nares patent, oropharynx clear without exudates, moist mucous 

membranes.


NECK: Trachea midline, full range of motion, supple. 


LUNGS: Breath sounds equal, clear to auscultation bilaterally, no wheezes, no 

crackles, no accessory muscle use. 


HEART: Regular rate and rhythm, S1, S2 without murmur, rub or gallop.


ABDOMEN: Soft, nontender, nondistended, normoactive bowel sounds, no guarding, 

no rebound, no hepatosplenomegaly, no masses.


EXTREMITIES: 2+ pulses, warm, well-perfused, no edema. 


NEUROLOGICAL: Cranial nerves II through XII grossly intact. Normal speech, gait 

not observed.


PSYCH: Normal mood, normal affect.


SKIN: Warm, dry, normal turgor, no rashes or lesions noted.





LABS


 Laboratory Results - last 24 hr











  01/06/18 01/06/18





  07:47 07:47


 


WBC  6.2  D 


 


RBC  4.14 


 


Hgb  12.2 


 


Hct  35.9 


 


MCV  86.8 


 


MCH  29.6 


 


MCHC  34.1 


 


RDW  15.0 


 


Plt Count  165 


 


MPV  8.7 


 


Neutrophils %  76.4  D 


 


Lymphocytes %  17.4  D 


 


Monocytes %  5.9 


 


Eosinophils %  0.0  D 


 


Basophils %  0.3 


 


Sodium   142


 


Potassium   3.7


 


Chloride   107


 


Carbon Dioxide   26


 


Anion Gap   9


 


BUN   5 L D


 


Creatinine   0.7  D


 


Creat Clearance w eGFR   > 60


 


Random Glucose   90


 


Calcium   7.6 L


 


Total Bilirubin   0.5


 


AST   18  D


 


ALT   47  D


 


Alkaline Phosphatase   136 H


 


Total Protein   5.5 L


 


Albumin   2.6 L











HOSPITAL COURSE:





Date of Admission:01/02/18





Date of Discharge: 01/06/18














Discharge Summary


Reason For Visit: CHOLECYSTISIS


Condition: Improved





- Instructions


Diet, Activity, Other Instructions: 


Dr. Vegas Discharge Instructions





Dear TELLY MANCERA,





Post Operative Instructions





Physical activity


Resume your normal everyday activity as tolerated no heavy lifting or exercise 

until seen by your surgeon. You may walk unlimited amounts of and climb stairs. 

You may resume driving the car when you feel safe and comfortable behind the 

wheel.





Wound care


If you have a bandage, leave it on, and keep dry for 48 - 72 hours. After that 

time discard the outer bandage. If there are tapes on the skin under the outer 

bandage, leave them in place. They will peel off in the next 7 to 10 days. Do 

Not peel them off. You may shower 2 days after surgery. If there are tapes 

present on the skin, they can get wet.





Diet


There are no dietary restrictions. Eat healthy, high-fiber foods. Drink 6 to 8 

glasses of liquid each day. This will assist in keeping your bowels are regular.





Pain management


You may take Tylenol or acetaminophen or Ibuprofen (for example, Motrin, Advil 

etc.) Any pain prescription medication ordered should be taken as prescribed 

for moderate to severe pain.





Call Dr. Vegas for any of the following:





Severe pain not relieved by medication


Fever of 101 or higher


Excessive bleeding or drainage on dressing


Inability to urinate





Call the office at [phone] for a post operative appointment in 7 - 10 days.





Referrals: 


Dyllan Gordon [Primary Care Provider] - 


Disposition: HOME





- Home Medications


Comprehensive Discharge Medication List: 


Ambulatory Orders





Oxycodone HCl/Acetaminophen [Percocet 5-325 mg Tablet] 1 tab PO Q6H PRN #8 

tablet MDD 4 01/06/18 











- Discharge Referral


Referred to [facility] No

## 2018-01-08 NOTE — OP
DATE OF OPERATION:  01/05/2018

 

PREOPERATIVE DIAGNOSIS:  Status post gallstone pancreatitis.  

 

POSTOPERATIVE DIAGNOSIS:  Status post gallstone pancreatitis.  

 

PROCEDURE:  Laparoscopic cholecystectomy.  

 

SURGEON:  Benjy Vegas M.D. 

 

ASSISTANT:  Sea Basurto PA-C 

 

ANESTHESIA:  General.

 

OPERATIVE FINDINGS:  There was evidence of acute cholecystitis and inflammation 
in

the right upper quadrant.  The rest of the findings are unremarkable.  

 

PROCEDURE:  The patient was placed on operating table in supine position, and 
after

the induction of general anesthesia, the patient's abdomen was prepped with

Chloraprep and draped in sterile fashion.  A timeout was taken.  
Pneumoperitoneum was

established above the umbilicus using a Veress needle to an intraabdominal 
pressure

of 15 mmHg.  Subsequently a 5-mm supraumbilical port was placed and laparoscopy

carried out.  Additional lateral 5-mm ports in the subxiphoid, 12-mm port were

placed.  The gallbladder was then placed on cephalad and lateral retraction, and

using a combination of blunt dissection and electrocautery, adhering omentum 
through

the gallbladder was taken down until hepatocystic triangle could be exposed.  
Next,

the peritoneum over the neck of the gallbladder was opened medially and 
laterally

using electrocautery and blunt dissection.  The cystic artery was found to be

coursing over the cystic duct and both structures were dissected separately for

length proximally and distally.  Each structure was then clipped twice 
proximally and

twice distally with large hemoclips and divided using the Endoshears.  This was 
done

after a critical view of safety was taken.  Next, the gallbladder was removed 
from

the liver bed in the retrograde fashion using electrocautery until it was at 
the edge

of the liver.  Hemostasis was checked for and noted to be good, and then the

gallbladder removed from edge of the liver, placed in an Endocatch and brought 
out

through the subxiphoid port.  Pneumoperitoneum was reestablished, and copious

irrigation carried out until the return was clear.  Hemostasis was again 
verified and

then a 10-mm Ha-Goldman drain was placed in the right hepatorenal fossa and

brought out through one of the 5-mm port sites where it was secured to the skin 
with

2-0 silk suture.  All ports removed under laparoscopic vision without evidence 
of

bleeding from the port sites, and the pneumoperitoneum evacuated, and each port 
site

infiltrated with 0.5% Marcaine.  The skin edges were closed with continuous 4-0

Monocryl followed by Steri-Strips and band-aid dressings.  The drain was 
connected to

bulb self suction, and the patient aroused from general anesthesia, and then

transferred to the post anesthesia care unit in stable condition awake and alert
,

estimated blood loss 25 mL.  Replacements crystalloid.  Drains one 10-mm

Ha-Goldman.  Specimen gallbladder and contents to pathology.  I, Benjy Vegas MD, was

physically present in the operating room from the time the patient was placed 
on the

operating room table until he was transferred to the post anesthesia care unit 
under

my accompaniment.  

 

 

MD PETER Shore/[id number]

DD: 01/08/2018 10:16

DT: 01/08/2018 20:00

Job #:  [id number]

MTDD

## 2018-01-08 NOTE — PATH
Surgical Pathology Report



Patient Name:  TELLY MANCERA

Accession #:  [id number]

Med. Rec. #:  [id number]                                                        

   /Age/Gender:  1960 (Age: 57) / M

Account:  [id number]                                                          

             Location: [facility] MED/[facility]

Taken:  2018

Received:  2018

Reported:  2018

Physicians:  MD Rissa Alfaro, MAXINE

  



Specimen(s) Received

 GALLBLADDER 





Clinical History

Gallstone, pancreatitis







Final Diagnosis

GALLBLADDER, LAPAROSCOPIC CHOLECYSTECTOMY:

ACUTE AND CHRONIC CHOLECYSTITIS.





***Electronically Signed***

Elo Qureshi M.D.





Gross Description

Received in formalin, labeled "gallbladder," is a 8.0 x 3.5 x 2.3 cm.

gallbladder with a 0.2 cm. in length portion of cystic duct attached. The outer

surface is tan-pink with a focal defect and varies from smooth to shaggy. The

lumen contains green, tenacious bile. There are no choleliths identified. The

mucosa is tan and focally eroded. The wall of the gallbladder ranges from

0.1-0.5 cm. in thickness. Representative sections are submitted in one cassette.

 

/2018

## 2019-09-16 ENCOUNTER — TRANSCRIPTION ENCOUNTER (OUTPATIENT)
Age: 59
End: 2019-09-16

## 2019-09-16 ENCOUNTER — EMERGENCY (EMERGENCY)
Facility: HOSPITAL | Age: 59
LOS: 0 days | Discharge: HOME | End: 2019-09-16
Attending: EMERGENCY MEDICINE | Admitting: EMERGENCY MEDICINE
Payer: COMMERCIAL

## 2019-09-16 VITALS
OXYGEN SATURATION: 97 % | RESPIRATION RATE: 20 BRPM | HEIGHT: 73 IN | HEART RATE: 76 BPM | WEIGHT: 272.93 LBS | DIASTOLIC BLOOD PRESSURE: 81 MMHG | SYSTOLIC BLOOD PRESSURE: 128 MMHG

## 2019-09-16 VITALS
HEART RATE: 90 BPM | DIASTOLIC BLOOD PRESSURE: 70 MMHG | OXYGEN SATURATION: 97 % | RESPIRATION RATE: 19 BRPM | SYSTOLIC BLOOD PRESSURE: 130 MMHG

## 2019-09-16 DIAGNOSIS — I10 ESSENTIAL (PRIMARY) HYPERTENSION: ICD-10-CM

## 2019-09-16 DIAGNOSIS — R05 COUGH: ICD-10-CM

## 2019-09-16 LAB
ALBUMIN SERPL ELPH-MCNC: 4.4 G/DL — SIGNIFICANT CHANGE UP (ref 3.5–5.2)
ALP SERPL-CCNC: 75 U/L — SIGNIFICANT CHANGE UP (ref 30–115)
ALT FLD-CCNC: 25 U/L — SIGNIFICANT CHANGE UP (ref 0–41)
ANION GAP SERPL CALC-SCNC: 14 MMOL/L — SIGNIFICANT CHANGE UP (ref 7–14)
AST SERPL-CCNC: 27 U/L — SIGNIFICANT CHANGE UP (ref 0–41)
BASOPHILS # BLD AUTO: 0.02 K/UL — SIGNIFICANT CHANGE UP (ref 0–0.2)
BASOPHILS NFR BLD AUTO: 0.3 % — SIGNIFICANT CHANGE UP (ref 0–1)
BILIRUB SERPL-MCNC: 0.3 MG/DL — SIGNIFICANT CHANGE UP (ref 0.2–1.2)
BUN SERPL-MCNC: 15 MG/DL — SIGNIFICANT CHANGE UP (ref 10–20)
CALCIUM SERPL-MCNC: 9.1 MG/DL — SIGNIFICANT CHANGE UP (ref 8.5–10.1)
CHLORIDE SERPL-SCNC: 104 MMOL/L — SIGNIFICANT CHANGE UP (ref 98–110)
CO2 SERPL-SCNC: 24 MMOL/L — SIGNIFICANT CHANGE UP (ref 17–32)
CREAT SERPL-MCNC: 1 MG/DL — SIGNIFICANT CHANGE UP (ref 0.7–1.5)
EOSINOPHIL # BLD AUTO: 0.05 K/UL — SIGNIFICANT CHANGE UP (ref 0–0.7)
EOSINOPHIL NFR BLD AUTO: 0.7 % — SIGNIFICANT CHANGE UP (ref 0–8)
GLUCOSE SERPL-MCNC: 99 MG/DL — SIGNIFICANT CHANGE UP (ref 70–99)
HCT VFR BLD CALC: 43.9 % — SIGNIFICANT CHANGE UP (ref 42–52)
HGB BLD-MCNC: 14.9 G/DL — SIGNIFICANT CHANGE UP (ref 14–18)
IMM GRANULOCYTES NFR BLD AUTO: 0.3 % — SIGNIFICANT CHANGE UP (ref 0.1–0.3)
LYMPHOCYTES # BLD AUTO: 2.09 K/UL — SIGNIFICANT CHANGE UP (ref 1.2–3.4)
LYMPHOCYTES # BLD AUTO: 29.4 % — SIGNIFICANT CHANGE UP (ref 20.5–51.1)
MAGNESIUM SERPL-MCNC: 2.1 MG/DL — SIGNIFICANT CHANGE UP (ref 1.8–2.4)
MCHC RBC-ENTMCNC: 30.3 PG — SIGNIFICANT CHANGE UP (ref 27–31)
MCHC RBC-ENTMCNC: 33.9 G/DL — SIGNIFICANT CHANGE UP (ref 32–37)
MCV RBC AUTO: 89.4 FL — SIGNIFICANT CHANGE UP (ref 80–94)
MONOCYTES # BLD AUTO: 0.38 K/UL — SIGNIFICANT CHANGE UP (ref 0.1–0.6)
MONOCYTES NFR BLD AUTO: 5.3 % — SIGNIFICANT CHANGE UP (ref 1.7–9.3)
NEUTROPHILS # BLD AUTO: 4.56 K/UL — SIGNIFICANT CHANGE UP (ref 1.4–6.5)
NEUTROPHILS NFR BLD AUTO: 64 % — SIGNIFICANT CHANGE UP (ref 42.2–75.2)
NRBC # BLD: 0 /100 WBCS — SIGNIFICANT CHANGE UP (ref 0–0)
PLATELET # BLD AUTO: 174 K/UL — SIGNIFICANT CHANGE UP (ref 130–400)
POTASSIUM SERPL-MCNC: 3.9 MMOL/L — SIGNIFICANT CHANGE UP (ref 3.5–5)
POTASSIUM SERPL-SCNC: 3.9 MMOL/L — SIGNIFICANT CHANGE UP (ref 3.5–5)
PROT SERPL-MCNC: 6.9 G/DL — SIGNIFICANT CHANGE UP (ref 6–8)
RBC # BLD: 4.91 M/UL — SIGNIFICANT CHANGE UP (ref 4.7–6.1)
RBC # FLD: 12.4 % — SIGNIFICANT CHANGE UP (ref 11.5–14.5)
SODIUM SERPL-SCNC: 142 MMOL/L — SIGNIFICANT CHANGE UP (ref 135–146)
TROPONIN T SERPL-MCNC: <0.01 NG/ML — SIGNIFICANT CHANGE UP
WBC # BLD: 7.12 K/UL — SIGNIFICANT CHANGE UP (ref 4.8–10.8)
WBC # FLD AUTO: 7.12 K/UL — SIGNIFICANT CHANGE UP (ref 4.8–10.8)

## 2019-09-16 PROCEDURE — 71046 X-RAY EXAM CHEST 2 VIEWS: CPT | Mod: 26

## 2019-09-16 PROCEDURE — 99285 EMERGENCY DEPT VISIT HI MDM: CPT

## 2019-09-16 RX ORDER — IPRATROPIUM/ALBUTEROL SULFATE 18-103MCG
3 AEROSOL WITH ADAPTER (GRAM) INHALATION
Refills: 0 | Status: COMPLETED | OUTPATIENT
Start: 2019-09-16 | End: 2019-09-16

## 2019-09-16 RX ADMIN — Medication 3 MILLILITER(S): at 20:03

## 2019-09-16 RX ADMIN — Medication 3 MILLILITER(S): at 20:37

## 2019-09-16 RX ADMIN — Medication 3 MILLILITER(S): at 20:14

## 2019-09-16 NOTE — ED PROVIDER NOTE - PATIENT PORTAL LINK FT
You can access the FollowMyHealth Patient Portal offered by [facility] by registering at the following website: [email or web address]. By joining [facility]’s FollowMyHealth portal, you will also be able to view your health information using other applications (apps) compatible with our system.

## 2019-09-16 NOTE — ED PROVIDER NOTE - PHYSICAL EXAMINATION
CONST: Well appearing in NAD  EYES: PERRL, EOMI, Sclera and conjunctiva clear.   ENT: No nasal discharge. Oropharynx normal appearing, no erythema or exudates. No abscess or swelling. Uvula midline.   NECK: Non-tender, no meningeal signs. normal ROM. supple   CARD: S1 S2; No jvd  RESP: Equal BS B/L, No wheezes, rhonchi or rales. No distress  GI: Soft, non-tender, non-distended. normal BS  MS: Normal ROM in all extremities. pulses 2 +. no calf tenderness or swelling  SKIN: Warm, dry, no acute rashes. Good turgor  NEURO: A&Ox3, No focal deficits. Strength 5/5 with no sensory deficits. Steady gait.

## 2019-09-16 NOTE — ED PROVIDER NOTE - NS ED ROS FT
Constitutional: (-) fever  Eyes/ENT: (-) blurry vision, (-) epistaxis  Cardiovascular: (-) chest pain, (-) syncope  Respiratory: (+) cough, (-) shortness of breath  Gastrointestinal: (-) vomiting, (-) diarrhea  Musculoskeletal: (-) neck pain, (-) back pain, (-) joint pain  Integumentary: (-) rash, (-) edema  Neurological: (-) headache, (-) altered mental status  Allergic/Immunologic: (-) pruritus

## 2019-09-16 NOTE — ED PROVIDER NOTE - ATTENDING CONTRIBUTION TO CARE
57 yo M h/o HTN presents with c/o cough x 1 week.  Pt was seen in [place] and sent here for further evaluation,  no chest pain.  no fever or chills.  no SOB, no recent travel.  On exam pt in NAD AAO x 3, PERRL, Lungs CTA b/l no wrr, Lungs cta b/l no wrr, no edema, abd soft nt nd, no rash, OP clear clear

## 2019-09-16 NOTE — ED PROVIDER NOTE - OBJECTIVE STATEMENT
58y M pmh htn presents for evaluation of cough. Pt states he has had mild intermittent nonproductive cough x1wk, worse with deep inspiration, relived at rest. Denies fever, ha, cp, sob, weakness, numbness, downey, n/v/d/c

## 2019-09-16 NOTE — ED PROVIDER NOTE - NSFOLLOWUPINSTRUCTIONS_ED_ALL_ED_FT
Follow up with PMD in 1-2 days.    Cough    Coughing is a reflex that clears your throat and your airways. Coughing helps to heal and protect your lungs. It is normal to cough occasionally, but a cough that happens with other symptoms or lasts a long time may be a sign of a condition that needs treatment. Coughing may be caused by infections, asthma or COPD, smoking, postnasal drip, gastroesophageal reflux, as well as other medical conditions. Take medicines only as instructed by your health care provider. Avoid environments or triggers that causes you to cough at work or at home.    SEEK IMMEDIATE MEDICAL CARE IF YOU HAVE ANY OF THE FOLLOWING SYMPTOMS: coughing up blood, shortness of breath, rapid heart rate, chest pain, unexplained weight loss or night sweats.

## 2019-09-16 NOTE — ED ADULT TRIAGE NOTE - CHIEF COMPLAINT QUOTE
Pt referred by urgent care for SOB and cough for 6-9 days; pt stated that he had 5 shots bourbon in parking lot before walking into ED.

## 2019-10-07 ENCOUNTER — TRANSCRIPTION ENCOUNTER (OUTPATIENT)
Age: 59
End: 2019-10-07

## 2019-11-08 ENCOUNTER — HOSPITAL ENCOUNTER (EMERGENCY)
Dept: HOSPITAL 74 - JERFT | Age: 59
Discharge: HOME | End: 2019-11-08
Payer: COMMERCIAL

## 2019-11-08 VITALS — HEART RATE: 71 BPM | TEMPERATURE: 98 F | SYSTOLIC BLOOD PRESSURE: 142 MMHG | DIASTOLIC BLOOD PRESSURE: 91 MMHG

## 2019-11-08 VITALS — BODY MASS INDEX: 35.6 KG/M2
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
## 2019-11-08 DIAGNOSIS — Z91.048: ICD-10-CM

## 2019-11-08 DIAGNOSIS — Z90.49: ICD-10-CM

## 2019-11-08 DIAGNOSIS — S86.111A: Primary | ICD-10-CM
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
## 2019-11-08 DIAGNOSIS — Z87.19: ICD-10-CM

## 2019-11-08 NOTE — PDOC
History of Present Illness





- General


Chief Complaint: Pain


Stated Complaint: RT LEG PAIN


Time Seen by Provider: 11/08/19 14:36


History Source: Patient


Exam Limitations: Clinical Condition





- History of Present Illness


Initial Comments: 





11/08/19 15:30


Patient with no significant past medical history presented with complaint of 

pain to lateral aspect of right calf muscle after stepped on a curbside 

yesterday while taking the garbage and twisting right leg hearing a popping 

sound in the right calf muscle last night.  Patient report increased pain to 

right calf muscle with ambulation with swelling around calf muscle.  Denies 

numbness or tingling sensation.  Denies posterior knee pain or to any pain.  

Denies fall.  Denies shortness of breath, chest pain, palpitations.  Denies any 

other symptoms


Occurred: reports: yesterday


Pain Location: reports: lower extremity (righ calf muscle)


Method of Injury: Yes: other (twisting righ calf)


Modifying Factors: improves with: rest





Past History





- Past Medical History


Allergies/Adverse Reactions: 


 Allergies











Allergy/AdvReac Type Severity Reaction Status Date / Time


 


pollen extracts Allergy   Verified 11/08/19 14:07


 


seasonal allergies Allergy Mild  Uncoded 11/08/19 14:07











Home Medications: 


Ambulatory Orders





Oxycodone HCl/Acetaminophen [Percocet 5-325 mg Tablet] 1 tab PO Q6H PRN #8 

tablet MDD 4 01/06/18 


Naproxen 500 mg PO BID PRN #20 tablet 11/08/19 








COPD: No


GI Disorders: Yes (GERD,IBS)





- Surgical History


Abdominal Surgery: Yes (ING.HERNIA)


Appendectomy: Yes


Cholecystectomy: Yes





- Psycho Social/Smoking Cessation Hx


Smoking Status: No


Smoking History: Never smoked


Have you smoked in the past 12 months: No


Number of Cigarettes Smoked Daily: 0


Hx Alcohol Use: Yes


Drug/Substance Use Hx: Yes


Substance Use Type: Alcohol





**Review of Systems





- Review of Systems


Able to Perform ROS?: Yes


Is the patient limited English proficient: No


Constitutional: No: Malaise, Weakness


HEENTM: No: Symptoms Reported


Respiratory: No: Symptoms reported, See HPI, Cough, Orthopnea, Shortness of 

Breath, SOB with Exertion, SOB at Rest, Stridor, Wheezing, Productive cough, 

Hemoptysis, Other


Cardiac (ROS): No: Symptoms Reported, See HPI, Chest Pain, Edema, Irregular 

Heart Rate, Lightheadedness, Palpitations, Syncope, Chest Tightness, Other


Musculoskeletal: Yes: Symptoms Reported, See HPI, Muscle Pain (right lateral 

calf muscle pain).  No: Back Pain, Gout, Joint Pain, Joint Swelling, Muscle 

Weakness, Neck Pain, Joint Stiffness, Other


Integumentary: No: Symptoms Reported, Change in Color, Erythema


Neurological: No: Symptoms reported, Numbness, Paresthesia, Tingling, Weakness


All Other Systems: Reviewed and Negative





*Physical Exam





- Vital Signs


 Last Vital Signs











Temp Pulse Resp BP Pulse Ox


 


 98 F   71   18   142/91   100 


 


 11/08/19 14:04  11/08/19 14:04  11/08/19 14:04  11/08/19 14:04  11/08/19 14:04














- Physical Exam


Comments: 





11/08/19 15:34


GENERAL:


Well developed, well nourished. Awake and alert in mild acute distress.


PULMONARY: 


No evidence of respiratory distress. 


MUSCULOSKELETAL : moderate point tenderness to lateral aspect of right calf 

muscle with mild localized swelling to lateral gastrocnemius.  Negative Paniagua

's test.  No Achilles tendon insertion site tenderness. No bony deformities 


EXTREMITIES: 


No cyanosis. No clubbing. No edema.  Moderate point tenderness to lateral 

aspect of proximal right calf muscle.  Negative Paniagua's test.  No Achilles 

tendon insertion site tenderness.


SKIN: 


Warm and dry. Normal capillary refill. No Bruising, ecchymosis or skin erythema 

of right lower extremity.  No increased warmth to right calf muscle. 


NEUROLOGICAL: 


Alert, awake, appropriate.  No motor deficits in the  lower extremities.  Gait 

is normal without ataxia.


PSYCHIATRIC: 


Cooperative. Good eye contact. Appropriate mood and affect.


General Appearance: Yes: Nourished, Appropriately Dressed, Mild Distress





ED Treatment Course





- RADIOLOGY


Radiology Studies Ordered: 














 Category Date Time Status


 


 KNEE 3 POS-RIGHT [RAD] [facility]  11/08/19 14:36 Ordered


 


 LEG TIB/FIB-RIGHT [RAD] Stat Radiology  11/08/19 14:59 Ordered














Medical Decision Making





- Medical Decision Making





11/08/19 15:32


Patient with no significant past medical history presented with complaint of 

pain to lateral aspect of right calf muscle after stepped on a curbside 

yesterday while taking the garbage and twisting right leg hearing a popping 

sound in the right calf muscle last night.  Patient report increased pain to 

right calf muscle with ambulation with swelling around calf muscle.  Denies 

numbness or tingling sensation.  Denies posterior knee pain or to any pain.  

Denies fall.  Denies shortness of breath, chest pain, palpitations.  Denies any 

other symptoms


Exam significant for moderate point tenderness to lateral aspect of right calf 

muscle with mild localized swelling to lateral gastrocnemius.  Negative Paniagua

's test.  No Achilles tendon insertion site tenderness.


X-ray of right knee and lower leg shows no evidence of tendon rupture or soft 

tissue swelling.  Patient symptoms likely muscle strain.  Patient stable for 

discharge with advised to do hot compress 2-3 times a day as needed for pain 

with naproxen as needed for pain with orthopedics follow-up in 3 days if 

symptoms does not improve.  Plan discussed with patient patient understands 

follow-up instructions





Discharge





- Discharge Information


Problems reviewed: Yes


Clinical Impression/Diagnosis: 


Strain of calf muscle


Qualifiers:


 Encounter type: initial encounter Laterality: right Qualified Code(s): 

S86.811A - Strain of other muscle(s) and tendon(s) at lower leg level, right leg

, initial encounter





Condition: Stable


Disposition: HOME





- Admission


No





- Additional Discharge Information


Prescriptions: 


Naproxen 500 mg PO BID PRN #20 tablet


 PRN Reason: pain





- Follow up/Referral


Referrals: 


Tejas Mcdowell DO [Staff Physician] - 





- Patient Discharge Instructions


Patient Printed Discharge Instructions:  DI for Calf Muscle Strain


Additional Instructions: 


There is no evidence of rupture of the tendon on x-ray of right lower leg.  

symptoms likely caused by muscle strain.  Take prescribed medication as needed 

for pain.  Apply warm compress 2-3 times a day as needed for calf muscle pain 

and swelling.  Follow-up referred to orthopedics if no improvement in 3 days 

for possible MRI





- Post Discharge Activity

## 2021-08-10 ENCOUNTER — APPOINTMENT (OUTPATIENT)
Dept: PULMONOLOGY | Facility: CLINIC | Age: 61
End: 2021-08-10
Payer: COMMERCIAL

## 2021-08-10 VITALS — WEIGHT: 300 LBS | BODY MASS INDEX: 39.76 KG/M2 | HEIGHT: 73 IN

## 2021-08-10 DIAGNOSIS — Z78.9 OTHER SPECIFIED HEALTH STATUS: ICD-10-CM

## 2021-08-10 PROBLEM — Z00.00 ENCOUNTER FOR PREVENTIVE HEALTH EXAMINATION: Status: ACTIVE | Noted: 2021-08-10

## 2021-08-10 PROCEDURE — 99215 OFFICE O/P EST HI 40 MIN: CPT | Mod: 95

## 2021-08-10 RX ORDER — OMEPRAZOLE 20 MG/1
TABLET, DELAYED RELEASE ORAL
Refills: 0 | Status: ACTIVE | COMMUNITY

## 2021-08-10 NOTE — ASSESSMENT
[FreeTextEntry1] : 60-year-old man with severe obstructive sleep apnea has failed CPAP therapy and oral appliance therapy in the past, today we talked about bariatric surgery or mandibular advancement surgery.\par \par Patient is not interested in bariatric surgery given the lifestyle changes that are required after.\par \par Patient will see Dr. Jc Perea to get evaluated for mandibular advancement surgery.  We will repeat a sleep study to assess the current degree of sleep apnea as he has gained significant weight since his last sleep study.

## 2021-08-10 NOTE — REASON FOR VISIT
[Initial Evaluation] : an initial evaluation [Sleep Apnea] : sleep apnea [Home] : at home, [unfilled] , at the time of the visit. [Medical Office: ([facility])___] : at the medical office located in  [Verbal consent obtained from patient] : the patient, [unfilled]

## 2021-08-10 NOTE — HISTORY OF PRESENT ILLNESS
[FreeTextEntry1] : 60 year old man with history of gerd  is here in the sleep center to address sleep apnea.  Patient is sleepy with [place] sleepiness score of 14.  Patient has very loud snoring and has witnessed apneas.   He feels tired when he wakes up.    He is sleepy while driving.\par \par Patient was initially diagnosed many years ago has tried CPAP and has failed and could not keep the mask on, eventually had oral appliance made and this also did not work for the patient.\par \par Currently his sleep apnea is untreated and is causing significant symptoms for the patient.\par \par We discussed about alternative treatment options, his BMI is more than 35 so he will not be a candidate for inspire therapy at this time.  So we talked about bariatric surgery and mandibular advancement surgery.\par \par Patient is not interested in bariatric surgery due to the required lifestyle changes after the surgery, patient will consider mandibular advancement surgery.\par \par He will see Dr. Jc Jade to get evaluated further.  Since his last sleep study is 6 to 7 years ago and has gained significant weight since then, will also need a repeat study to assess his current sleep apnea degree.\par

## 2021-11-05 ENCOUNTER — APPOINTMENT (OUTPATIENT)
Dept: BARIATRICS | Facility: CLINIC | Age: 61
End: 2021-11-05
Payer: COMMERCIAL

## 2021-11-05 VITALS — BODY MASS INDEX: 37.6 KG/M2 | WEIGHT: 285 LBS

## 2021-11-05 DIAGNOSIS — K21.9 GASTRO-ESOPHAGEAL REFLUX DISEASE W/OUT ESOPHAGITIS: ICD-10-CM

## 2021-11-05 PROCEDURE — 99204 OFFICE O/P NEW MOD 45 MIN: CPT | Mod: 95

## 2021-11-05 NOTE — ASSESSMENT
[FreeTextEntry1] : Will order endoscopy (to see his GERD) and pulmonary clearance, will see cardiologist (echocardiogram)  patient encouraged to use the sleep machine (CPAP machine perioperative to make the surgery safe).  Will move forward after patient sees dietitian and psychiatrist and follow up soon.

## 2021-11-05 NOTE — HISTORY OF PRESENT ILLNESS
[de-identified] : Mannie Teague, initial consult, patient reports he has severe sleep apnea, narcolepsy unable to tolerate CPAP, works at [employer]. Patient has not seen a cardiologist, and patient can walk fine. Patient went down from 300 lbs to 285 lbs. Patient reports he has severe acid reflux. Patient is concerned about his severe sleep apnea and wants to live a long life for his child. Discussed with patient the risks/benefits with sleeve. Patient is at a high risk of pulmonary hypertension and heart problems. Will order endoscopy (to see his GERD) and pulmonary clearance, will see cardiologist (echocardiogram)  patient encouraged to use the sleep machine (CPAP machine perioperative to make the surgery safe).  Will move forward after patient sees dietitian and psychiatrist and follow up soon.

## 2021-11-08 ENCOUNTER — APPOINTMENT (OUTPATIENT)
Dept: PULMONOLOGY | Facility: CLINIC | Age: 61
End: 2021-11-08
Payer: COMMERCIAL

## 2021-11-08 VITALS
BODY MASS INDEX: 38.43 KG/M2 | DIASTOLIC BLOOD PRESSURE: 90 MMHG | WEIGHT: 290 LBS | HEIGHT: 73 IN | SYSTOLIC BLOOD PRESSURE: 130 MMHG

## 2021-11-08 PROCEDURE — 99213 OFFICE O/P EST LOW 20 MIN: CPT

## 2021-11-08 NOTE — HISTORY OF PRESENT ILLNESS
[FreeTextEntry1] : 61 year old man with history of gerd, obesity  is here in the sleep center to address sleep apnea.  Patient is sleepy with [place] sleepiness score of 14.  Patient has very loud snoring and has witnessed apneas.   He feels tired when he wakes up.    He is sleepy while driving.\par \par Patient was initially diagnosed many years ago has tried CPAP and has failed and could not keep the mask on, eventually had oral appliance made and this also did not work for the patient.\par \par Currently his sleep apnea is untreated and is causing significant symptoms for the patient.\par He is considering bariatric surgery.\par \par pt underwent sleep study which showed ahi 87.2\par He is willing to do cpap therapy

## 2021-11-08 NOTE — ASSESSMENT
[FreeTextEntry1] : 61-year-old man with severe obstructive sleep apnea with AHI of 87.\par \par We will get a CPAP titration study given the severity of sleep apnea and significant desaturations with the lowest oxygen saturation of 63%.\par \par While waiting for the titration study we will order the CPAP due to supply chain issues it may take about 2 months to get a machine nowadays so we will go ahead and order the machine for him and once the titration study is done we can set up his new machine.

## 2022-10-21 ENCOUNTER — APPOINTMENT (OUTPATIENT)
Dept: RADIOLOGY | Facility: CLINIC | Age: 62
End: 2022-10-21

## 2022-10-21 PROCEDURE — 74240 X-RAY XM UPR GI TRC 1CNTRST: CPT

## 2024-06-04 ENCOUNTER — APPOINTMENT (OUTPATIENT)
Dept: PULMONOLOGY | Facility: CLINIC | Age: 64
End: 2024-06-04
Payer: COMMERCIAL

## 2024-06-04 DIAGNOSIS — G47.33 OBSTRUCTIVE SLEEP APNEA (ADULT) (PEDIATRIC): ICD-10-CM

## 2024-06-04 DIAGNOSIS — E66.01 MORBID (SEVERE) OBESITY DUE TO EXCESS CALORIES: ICD-10-CM

## 2024-06-04 DIAGNOSIS — Z78.9 OTHER SPECIFIED HEALTH STATUS: ICD-10-CM

## 2024-06-04 PROCEDURE — 99213 OFFICE O/P EST LOW 20 MIN: CPT

## 2024-06-04 RX ORDER — ATORVASTATIN CALCIUM 80 MG/1
TABLET, FILM COATED ORAL
Refills: 0 | Status: ACTIVE | COMMUNITY

## 2024-06-04 RX ORDER — TIRZEPATIDE 15 MG/.5ML
INJECTION, SOLUTION SUBCUTANEOUS
Refills: 0 | Status: ACTIVE | COMMUNITY

## 2024-06-04 RX ORDER — LISINOPRIL 30 MG/1
TABLET ORAL
Refills: 0 | Status: ACTIVE | COMMUNITY

## 2024-06-04 NOTE — HISTORY OF PRESENT ILLNESS
[FreeTextEntry1] : 63 year old man with history of gerd, obesity  is here in the sleep center to address sleep apnea.  Patient is sleepy with [place] sleepiness score of 14.  Patient has very loud snoring and has witnessed apneas.   He feels tired when he wakes up.    He is sleepy while driving.  Patient was initially diagnosed many years ago has tried CPAP and has failed and could not keep the mask on, eventually had oral appliance made and this also did not work for the patient.  Currently his sleep apnea is untreated and is causing significant symptoms for the patient. He is on zepbound  He also stopped drinking alcohol, he used to drink 15 drinks a day.  We talked about inspire therapy today.   [place] address - [address], [place] [address]

## 2024-06-04 NOTE — REASON FOR VISIT
[Follow-Up] : a follow-up visit [Sleep Apnea] : sleep apnea [TextEntry] :  This visit is done virtually as per patients request. Patient understood limitations of tele visit and agrees to move forward. patients location - home doctors location - [place], [place] identification verified with patients name and date of birth.

## 2024-06-04 NOTE — ASSESSMENT
[FreeTextEntry1] : Treatment Plan:  Inspire Therapy Evaluation: Discussed with the patient regarding Inspire therapy as an alternative treatment option for sleep apnea. Review of eligibility criteria and potential benefits of Inspire therapy. Referral to an otolaryngologist for further evaluation and consideration of Inspire therapy.  Encourage continued abstinence from alcohol consumption to optimize overall health and sleep quality.  Offer ongoing support and education regarding sleep apnea and its management. Given the patient's history of failed CPAP and oral appliance therapy, exploring alternative treatment options such as Inspire therapy is warranted.   Will order sleep study to evaluate the degree of apnea given weight loss with zepbound and no alcohol.